# Patient Record
Sex: FEMALE | Employment: FULL TIME | URBAN - METROPOLITAN AREA
[De-identification: names, ages, dates, MRNs, and addresses within clinical notes are randomized per-mention and may not be internally consistent; named-entity substitution may affect disease eponyms.]

---

## 2018-02-22 ENCOUNTER — OFFICE VISIT (OUTPATIENT)
Dept: FAMILY MEDICINE CLINIC | Facility: CLINIC | Age: 28
End: 2018-02-22

## 2018-02-22 VITALS
OXYGEN SATURATION: 98 % | HEART RATE: 90 BPM | TEMPERATURE: 98 F | SYSTOLIC BLOOD PRESSURE: 102 MMHG | DIASTOLIC BLOOD PRESSURE: 70 MMHG

## 2018-02-22 DIAGNOSIS — J02.0 STREP PHARYNGITIS: Primary | ICD-10-CM

## 2018-02-22 DIAGNOSIS — J03.90 TONSILLITIS: ICD-10-CM

## 2018-02-22 LAB
CONTROL LINE PRESENT WITH A CLEAR BACKGROUND (YES/NO): YES YES/NO
STREP GRP A CUL-SCR: POSITIVE

## 2018-02-22 PROCEDURE — 99203 OFFICE O/P NEW LOW 30 MIN: CPT

## 2018-02-22 PROCEDURE — 87880 STREP A ASSAY W/OPTIC: CPT

## 2018-02-22 RX ORDER — AMOXICILLIN 875 MG/1
875 TABLET, COATED ORAL 2 TIMES DAILY
Qty: 20 TABLET | Refills: 0 | Status: SHIPPED | OUTPATIENT
Start: 2018-02-22

## 2018-02-22 NOTE — PATIENT INSTRUCTIONS
Pharyngitis: Strep (Confirmed)    You have had a positive test for strep throat. Strep throat is a contagious illness. It is spread by coughing, kissing or by touching others after touching your mouth or nose.  Symptoms include throat pain that is worse w · Lymph nodes getting larger or becoming soft in the middle  · You can't swallow liquids or you can't open your mouth wide because of throat pain  · Signs of dehydration. These include very dark urine or no urine, sunken eyes, and dizziness.   · Trouble stephanie

## 2018-02-22 NOTE — PROGRESS NOTES
Heidi Robles is a 28year old female. CHIEF COMPLAINT:   Patient presents with:  Pharyngitis: fever since 2/210/18      HPI:   Patient presents with 2 day history of sore throat.   Patient reports the following associated symptoms: mild HA       Denies Comfort measures explained and discussed as listed in Patient Instructions    Follow up in 3-5 days if not improving, condition worsens, or fever greater than or equal to 100.4 persists for 72 hours.       Patient Instructions     Pharyngitis: Strep (Confir Call your healthcare provider right away if any of these occur:  · Fever of 100.4ºF (38ºC) or higher, or as directed by your healthcare provider  · New or worsening ear pain, sinus pain, or headache  · Painful lumps in the back of neck  · Stiff neck  · Lym

## 2025-02-24 ENCOUNTER — OFFICE VISIT (OUTPATIENT)
Dept: OTOLARYNGOLOGY | Facility: CLINIC | Age: 35
End: 2025-02-24
Payer: COMMERCIAL

## 2025-02-24 ENCOUNTER — OFFICE VISIT (OUTPATIENT)
Dept: AUDIOLOGY | Facility: CLINIC | Age: 35
End: 2025-02-24
Payer: COMMERCIAL

## 2025-02-24 VITALS — WEIGHT: 177.63 LBS | HEIGHT: 63 IN | BODY MASS INDEX: 31.47 KG/M2

## 2025-02-24 DIAGNOSIS — H81.90 EPISODIC RECURRENT VERTIGO: Primary | ICD-10-CM

## 2025-02-24 DIAGNOSIS — R42 DIZZINESS: Primary | ICD-10-CM

## 2025-02-24 PROCEDURE — 92567 TYMPANOMETRY: CPT | Performed by: AUDIOLOGIST

## 2025-02-24 PROCEDURE — 99203 OFFICE O/P NEW LOW 30 MIN: CPT | Performed by: SPECIALIST

## 2025-02-24 PROCEDURE — 92552 PURE TONE AUDIOMETRY AIR: CPT | Performed by: AUDIOLOGIST

## 2025-02-24 PROCEDURE — 92556 SPEECH AUDIOMETRY COMPLETE: CPT | Performed by: AUDIOLOGIST

## 2025-02-24 RX ORDER — MECLIZINE HCL 12.5 MG 12.5 MG/1
12.5 TABLET ORAL 3 TIMES DAILY PRN
Qty: 30 TABLET | Refills: 0 | Status: SHIPPED | OUTPATIENT
Start: 2025-02-24

## 2025-02-24 RX ORDER — MECLIZINE HYDROCHLORIDE 25 MG/1
TABLET ORAL
COMMUNITY
Start: 2024-11-10

## 2025-02-25 NOTE — PATIENT INSTRUCTIONS
We discussed your episodic vertigo.  You had an audiogram which was normal.  I think it is very likely that you are having a benign positional vertigo.  You were given a handout on Esquivel-Judy exercises.  I would wait on further workup for now.  Please call me or follow-up if your symptoms return.

## 2025-02-25 NOTE — PROGRESS NOTES
Abril Munguia is a 34 year old female.   Chief Complaint   Patient presents with    New Patient    Dizziness     Dizziness/vertigo     HPI:   Patient here has had vertigo a year and a half ago and now recently worse with turning her head.  The vertigo lasts for a few seconds.  The problem has been going on for about the past 2 months.  She could not determine which side precipitated the dizziness.    Current Outpatient Medications   Medication Sig Dispense Refill    meclizine 12.5 MG Oral Tab Take 1 tablet (12.5 mg total) by mouth 3 (three) times daily as needed. 30 tablet 0    meclizine 25 MG Oral Tab TAKE 1 TABLET BY MOUTH 3 TO 4 TIMES PER DAY AS NEEDED FOR DIZZINESS (Patient not taking: Reported on 2/24/2025)      amoxicillin 875 MG Oral Tab Take 1 tablet (875 mg total) by mouth 2 (two) times daily. 20 tablet 0      History reviewed. No pertinent past medical history.   Social History:  Social History     Socioeconomic History    Marital status:    Tobacco Use    Smoking status: Never    Smokeless tobacco: Never   Vaping Use    Vaping status: Never Used   Substance and Sexual Activity    Alcohol use: Yes     Comment: occasionally    Drug use: Never        REVIEW OF SYSTEMS:   GENERAL HEALTH: feels well otherwise  GENERAL : denies fever, chills, sweats, weight loss, weight gain  SKIN: denies any unusual skin lesions or rashes  RESPIRATORY: denies shortness of breath with exertion  NEURO: denies headaches    EXAM:   Ht 5' 3\" (1.6 m)   Wt 177 lb 9.6 oz (80.6 kg)   BMI 31.46 kg/m²   System Details   Skin Inspection - Normal.   Constitutional Overall appearance - Normal.   Head/Face Facial features - Normal. Eyebrows - Normal. Skull - Normal.   Eyes Conjunctiva - Right: Normal, Left: Normal. Pupil - Right: Normal, Left: Normal.   Pupils equal round reactive to light and accommodation extraocular motion intact no nystagmus   Ears Inspection - Right: Normal, Left: Normal.   Canal - Right: Normal, Left:  Normal.   TM - Right: Normal, Left: Normal.  No middle ear fluid either ear   Nasal External nose - Normal.   Nasal septum - Normal.  Turbinates - Normal.   Oral/Oropharynx Lips - Normal, Tonsils - Normal, Tongue - Normal    Neck Exam Inspection - Normal. Palpation - Normal. Parotid gland - Normal. Thyroid gland - Normal.  No carotid bruits by auscultation   Lymph Detail Submental. Submandibular. Anterior cervical. Posterior cervical. Supraclavicular all without enlargement   Psychiatric Orientation - Oriented to time, place, person & situation. Appropriate mood and affect.   Neurological Memory - Normal. Cranial nerves - Cranial nerves II through XII grossly intact.  Gait and position testing within normal limits     ASSESSMENT AND PLAN:   1. Episodic recurrent vertigo  Normal audiogram.  We discussed this episodic vertigo which sounds to be benign paroxysmal positional vertigo.  Patient was given a handout for Alvin Monsivais of exercises.  Audiogram was reviewed with the patient at the date of her visit and she was given a copy.  I asked her to call or follow-up should her symptoms return.    - Audiology Referral - In Network      The patient indicates understanding of these issues and agrees to the plan.      Kavitha Mtz MD  2/24/2025  8:32 PM

## (undated) NOTE — LETTER
Date: 2/22/2018    Patient Name: Jonh Rodriguez          To Whom it may concern: This letter has been written at the patient's request. The above patient was seen at the [facility] for treatment of a medical condition.     This patient wanda

## (undated) NOTE — LETTER
Date: 2/22/2018    Patient Name: Chiqui Houser          To Whom it may concern: This letter has been written at the patient's request. The above patient was seen at the [facility] [place] for treatment of a medical condition.     This patient wanda